# Patient Record
Sex: MALE | HISPANIC OR LATINO
[De-identification: names, ages, dates, MRNs, and addresses within clinical notes are randomized per-mention and may not be internally consistent; named-entity substitution may affect disease eponyms.]

---

## 2018-02-05 ENCOUNTER — HOSPITAL (OUTPATIENT)
Dept: OTHER | Age: 38
End: 2018-02-05
Attending: INTERNAL MEDICINE

## 2018-05-30 ENCOUNTER — HOSPITAL (OUTPATIENT)
Dept: OTHER | Age: 38
End: 2018-05-30
Attending: INTERNAL MEDICINE

## 2018-06-12 ENCOUNTER — CHARTING TRANS (OUTPATIENT)
Dept: OTHER | Age: 38
End: 2018-06-12

## 2018-11-01 VITALS
DIASTOLIC BLOOD PRESSURE: 78 MMHG | OXYGEN SATURATION: 97 % | HEIGHT: 68 IN | WEIGHT: 209.99 LBS | HEART RATE: 76 BPM | SYSTOLIC BLOOD PRESSURE: 116 MMHG | BODY MASS INDEX: 31.83 KG/M2

## 2019-11-16 ENCOUNTER — HOSPITAL (OUTPATIENT)
Dept: OTHER | Age: 39
End: 2019-11-16
Attending: PHYSICIAN ASSISTANT

## 2023-08-10 ENCOUNTER — OFFICE VISIT (OUTPATIENT)
Dept: OCCUPATIONAL MEDICINE | Age: 43
End: 2023-08-10

## 2023-08-10 VITALS — TEMPERATURE: 98.2 F | HEART RATE: 83 BPM | OXYGEN SATURATION: 98 %

## 2023-08-10 DIAGNOSIS — J02.9 SORE THROAT: ICD-10-CM

## 2023-08-10 DIAGNOSIS — J02.9 PHARYNGITIS, UNSPECIFIED ETIOLOGY: Primary | ICD-10-CM

## 2023-08-10 LAB
EXP DATE SOLUTION: NORMAL
EXP DATE SWAB: NORMAL
EXPIRATION DATE: NORMAL
GROUP A STREP ANTIGEN, POC: NEGATIVE
LOT NUMBER POC: NORMAL
LOT NUMBER SOLUTION: NORMAL
LOT NUMBER SWAB: NORMAL
QUICKVUE INFLUENZA TEST: NEGATIVE
SARS-COV-2 RNA, POC: NEGATIVE
VALID INTERNAL CONTROL, POC: YES
VALID INTERNAL CONTROL, POC: YES

## 2023-08-10 RX ORDER — AZITHROMYCIN 250 MG/1
TABLET, FILM COATED ORAL
Qty: 6 TABLET | Refills: 0 | Status: SHIPPED | OUTPATIENT
Start: 2023-08-10

## 2023-08-10 ASSESSMENT — ENCOUNTER SYMPTOMS
DIARRHEA: 0
VISUAL CHANGE: 0
SWOLLEN GLANDS: 0
COUGH: 1
CHEST TIGHTNESS: 0
EYES NEGATIVE: 1
ABDOMINAL PAIN: 0
SHORTNESS OF BREATH: 0
NAUSEA: 1
SINUS PAIN: 1
WHEEZING: 0
ALLERGIC/IMMUNOLOGIC NEGATIVE: 1
VOMITING: 0
SORE THROAT: 1
CHANGE IN BOWEL HABIT: 0
SINUS PRESSURE: 1
RHINORRHEA: 1

## 2024-10-22 ENCOUNTER — OFFICE VISIT (OUTPATIENT)
Dept: ORTHOPEDIC SURGERY | Age: 44
End: 2024-10-22

## 2024-10-22 DIAGNOSIS — M25.511 RIGHT SHOULDER PAIN, UNSPECIFIED CHRONICITY: Primary | ICD-10-CM

## 2024-10-22 DIAGNOSIS — M19.011 ARTHRITIS OF RIGHT ACROMIOCLAVICULAR JOINT: ICD-10-CM

## 2024-10-22 RX ORDER — METHYLPREDNISOLONE ACETATE 40 MG/ML
40 INJECTION, SUSPENSION INTRA-ARTICULAR; INTRALESIONAL; INTRAMUSCULAR; SOFT TISSUE ONCE
Status: COMPLETED | OUTPATIENT
Start: 2024-10-22 | End: 2024-10-22

## 2024-10-22 RX ADMIN — METHYLPREDNISOLONE ACETATE 40 MG: 40 INJECTION, SUSPENSION INTRA-ARTICULAR; INTRALESIONAL; INTRAMUSCULAR; SOFT TISSUE at 15:35

## 2024-10-22 NOTE — PROGRESS NOTES
Name: Evin Mitchell  YOB: 1980  Gender: male  MRN: [id number]  Date of Encounter:  10/22/2024       CHIEF COMPLAINT:     Chief Complaint   Patient presents with    Shoulder Pain     Right        SUBJECTIVE/OBJECTIVE:      HPI:    Evin Mitchell  is a 44 y.o. pleasant male who presents today for a new evaluation of his right shoulder pain.    He reports AC joint pain that started 4 months ago. He points directly to that region as being the area of pain. It began at work pulling at a heavy machine when he felt something \"pull\". He has had no real treatment since that time. He still works full duty, although tries to use this right arm less. He is RHD. He denies numbness. He does have a previously obtained MRI which shows AC joint arthritis, supraspinatus tendinosis with partial tear, possible SLAP tear. A medical  is present with him today.       PAST HISTORY:   Past medical, surgical, family, social history and allergies reviewed by me.   Pertinent history:   Tobacco use:  has no history on file for tobacco use.  Diabetes: none  No results found for: \"LABA1C\"  Anticoagulation: no      REVIEW OF SYSTEMS:   As noted in HPI.     PHYSICAL EXAMINATION:     Gen: Well-developed, no acute distress   HEENT: NC/AT, EOMI   Neck: Trachea midline, normal ROM   CV: Regular rhythm by palpation of distal pulse, normal capillary refill   Pulm: No respiratory distress, no stridor   Psychiatric: Well oriented, normal mood and affect.   Skin: No rashes, lesions or ulcers, normal temperature, turgor, and texture on uninvolved extremity.      ORTHO EXAM:    Right Shoulder:     Inspection: No biceps deformity, prominent AC joint  ROM active  passive: Symmetric forward flexion, abduction, external and internal rotation without significant pain..  Tenderness: Tenderness to AC joint  Strength: Abduction 5/5, External rotation 5/5, Internal rotation 5/5  Provocative tests: Negative Belly press, Jesus, Neer,

## 2024-11-18 NOTE — PROGRESS NOTES
Name: Evin Mitchell  YOB: 1980  Gender: male  MRN: [id number]  Date of Encounter:  11/20/2024       CHIEF COMPLAINT:     Chief Complaint   Patient presents with    Follow-up     Right Shoulder        SUBJECTIVE/OBJECTIVE:      HPI:    Patient is a 44 y.o. pleasant male who presents today for a return evaluation of his right shoulder.    Working diagnosis:   1. Arthritis of right acromioclavicular joint       LOV: 10/22/2024     Recall hx: He reports AC joint pain that started 4 months ago. He points directly to that region as being the area of pain. It began at work pulling at a heavy machine when he felt something \"pull\". He has had no real treatment since that time. He still works full duty, although tries to use this right arm less. He is RHD. He denies numbness. He does have a previously obtained MRI which shows AC joint arthritis, supraspinatus tendinosis with partial tear, possible SLAP tear. A medical  is present with him today.      Pain is at AC joint.   10/22/24: AC joint inj performed.   11/19/24: He had around 70% improvement.  He is able to sleep at night.  He has been completing work without significant discomfort.  The injection helped a lot.     PAST HISTORY:   Past medical, surgical, family, social history and allergies reviewed by me. Unchanged from prior visit.     REVIEW OF SYSTEMS:   As noted in HPI.     PHYSICAL EXAMINATION:     Gen: Well-developed, no acute distress   HEENT: NC/AT, EOMI   Neck: Trachea midline, normal ROM   CV: Regular rhythm by palpation of distal pulse, normal capillary refill   Pulm: No respiratory distress, no stridor   Psychiatric: Well oriented, normal mood and affect.   Skin: No rashes, lesions or ulcers, normal temperature, turgor, and texture on uninvolved extremity.      ORTHO EXAM:    Right Shoulder:     Inspection: no biceps deformity; no notable atrophy or erythema  ROM active  passive:   180. Abduction 170  170.  Ex

## 2024-11-19 ENCOUNTER — OFFICE VISIT (OUTPATIENT)
Dept: ORTHOPEDIC SURGERY | Age: 44
End: 2024-11-19
Payer: COMMERCIAL

## 2024-11-19 DIAGNOSIS — M19.011 ARTHRITIS OF RIGHT ACROMIOCLAVICULAR JOINT: Primary | ICD-10-CM

## 2024-11-19 PROCEDURE — 99213 OFFICE O/P EST LOW 20 MIN: CPT | Performed by: STUDENT IN AN ORGANIZED HEALTH CARE EDUCATION/TRAINING PROGRAM

## 2024-11-20 ENCOUNTER — CLINICAL DOCUMENTATION (OUTPATIENT)
Dept: ORTHOPEDIC SURGERY | Age: 44
End: 2024-11-20

## 2024-11-20 NOTE — PROGRESS NOTES
Invoice for requested 14B form was given to Medical Case Manager, Zoe Gama in office at time of visit. Payment is pending.